# Patient Record
Sex: FEMALE | Race: AMERICAN INDIAN OR ALASKA NATIVE | URBAN - METROPOLITAN AREA
[De-identification: names, ages, dates, MRNs, and addresses within clinical notes are randomized per-mention and may not be internally consistent; named-entity substitution may affect disease eponyms.]

---

## 2019-06-27 ENCOUNTER — OFFICE VISIT (OUTPATIENT)
Dept: FAMILY MEDICINE CLINIC | Age: 24
End: 2019-06-27
Payer: MEDICARE

## 2019-06-27 VITALS
SYSTOLIC BLOOD PRESSURE: 114 MMHG | RESPIRATION RATE: 16 BRPM | HEIGHT: 67 IN | TEMPERATURE: 97.7 F | DIASTOLIC BLOOD PRESSURE: 78 MMHG | WEIGHT: 181.8 LBS | HEART RATE: 66 BPM | BODY MASS INDEX: 28.53 KG/M2

## 2019-06-27 DIAGNOSIS — Z00.00 ENCOUNTER FOR MEDICAL EXAMINATION TO ESTABLISH CARE: ICD-10-CM

## 2019-06-27 DIAGNOSIS — L98.9 SKIN LESIONS, GENERALIZED: ICD-10-CM

## 2019-06-27 DIAGNOSIS — Z23 NEED FOR VACCINE FOR DT (DIPHTHERIA-TETANUS): ICD-10-CM

## 2019-06-27 DIAGNOSIS — Z11.4 ENCOUNTER FOR SCREENING FOR HIV: ICD-10-CM

## 2019-06-27 DIAGNOSIS — F41.9 ANXIETY: Primary | ICD-10-CM

## 2019-06-27 DIAGNOSIS — Z23 NEED FOR HPV VACCINATION: ICD-10-CM

## 2019-06-27 PROCEDURE — 90715 TDAP VACCINE 7 YRS/> IM: CPT | Performed by: PHYSICIAN ASSISTANT

## 2019-06-27 PROCEDURE — G8419 CALC BMI OUT NRM PARAM NOF/U: HCPCS | Performed by: PHYSICIAN ASSISTANT

## 2019-06-27 PROCEDURE — G8427 DOCREV CUR MEDS BY ELIG CLIN: HCPCS | Performed by: PHYSICIAN ASSISTANT

## 2019-06-27 PROCEDURE — 90471 IMMUNIZATION ADMIN: CPT | Performed by: PHYSICIAN ASSISTANT

## 2019-06-27 PROCEDURE — 99204 OFFICE O/P NEW MOD 45 MIN: CPT | Performed by: PHYSICIAN ASSISTANT

## 2019-06-27 PROCEDURE — 1036F TOBACCO NON-USER: CPT | Performed by: PHYSICIAN ASSISTANT

## 2019-06-27 RX ORDER — FLUOXETINE HYDROCHLORIDE 20 MG/1
20 CAPSULE ORAL DAILY
Qty: 30 CAPSULE | Refills: 3 | Status: SHIPPED | OUTPATIENT
Start: 2019-06-27

## 2019-06-27 ASSESSMENT — PATIENT HEALTH QUESTIONNAIRE - PHQ9
2. FEELING DOWN, DEPRESSED OR HOPELESS: 0
SUM OF ALL RESPONSES TO PHQ QUESTIONS 1-9: 0
SUM OF ALL RESPONSES TO PHQ9 QUESTIONS 1 & 2: 0
1. LITTLE INTEREST OR PLEASURE IN DOING THINGS: 0
SUM OF ALL RESPONSES TO PHQ QUESTIONS 1-9: 0

## 2019-06-27 ASSESSMENT — ENCOUNTER SYMPTOMS
CONSTIPATION: 0
BLOOD IN STOOL: 0
NAUSEA: 0
SORE THROAT: 0
BACK PAIN: 0
SINUS PRESSURE: 0
SHORTNESS OF BREATH: 0
DIARRHEA: 0
PHOTOPHOBIA: 0
CHEST TIGHTNESS: 0
ABDOMINAL PAIN: 0
EYE REDNESS: 0
COLOR CHANGE: 0
WHEEZING: 0
ABDOMINAL DISTENTION: 0
VOMITING: 0
COUGH: 0

## 2019-06-27 NOTE — PROGRESS NOTES
Visit Information    Have you changed or started any medications since your last visit including any over-the-counter medicines, vitamins, or herbal medicines? no   Are you having any side effects from any of your medications? -  no  Have you stopped taking any of your medications? Is so, why? -  no    Have you seen any other physician or provider since your last visit? No  Have you had any other diagnostic tests since your last visit? No  Have you been seen in the emergency room and/or had an admission to a hospital since we last saw you? No  Have you had your routine dental cleaning in the past 6 months? no    Have you activated your [employer] account? If not, what are your barriers?  No:      No care team member to display    Medical History Review  Past Medical, Family, and Social History reviewed and does not contribute to the patient presenting condition    Health Maintenance   Topic Date Due    Varicella Vaccine (1 of 2 - 13+ 2-dose series) 03/03/2008    HPV vaccine (1 - Female 3-dose series) 03/03/2010    HIV screen  03/03/2010    Chlamydia screen  03/03/2011    DTaP/Tdap/Td vaccine (1 - Tdap) 03/03/2014    Cervical cancer screen  03/03/2016    Flu vaccine (Season Ended) 09/01/2019    Pneumococcal 0-64 years Vaccine  Aged Out

## 2019-06-27 NOTE — PROGRESS NOTES
[address] [place] [address]  [address] [address]  Dept: [phone]  Dept Fax: [phone]    New Patient Visit Note  Date of patient's visit: 6/27/2019  Patient's Name:  Brandon  YOB: 1995            [address]  ______________________________________________________________________    Reason for visit: Establish care/Preventative care  ______________________________________________________________________  Chief Compliant   Establish Care      ______________________________________________________________________  History of Presenting Illness:  History was obtained from the patient. Brandon  is a 25 y.o. is here to establish care. Patient states that she has not been to a doctor in quite some time. She is here with her counselor who she has been seeing for the last 2-1/2 years. Patient has long-standing history of anxiety and depression. Anxiety is overwhelming and inhibits her from doing some daily activities. Patient has never been on any medications for her anxiety or depression. She denies any suicidal or homicidal ideations. Affect is good but she is obviously anxious during her appointment. Patient was referred to see a psychiatrist at one point and prescribed a medication but she never filled the medication because she was concerned about dependency. Patient states that her mom has dependency issues but also has a neurologic imbalance. Patient states that over the last many years she has noticed intermittent small red spots that pop up on various parts of her body. She states that she notices a new one once or twice a year. She denies any itching, pain, abnormal bleeding, heart palpitations, dizziness or headaches. Health maintenance was reviewed and patient states that her last Pap exam was at the age of 25 when she was pregnant. She has not had any recent STD testing.   She has not had HPV vaccine or recent tetanus shot. She is deciding on health maintenance updates. Questions were answered  Patient denies any smoking history, alcohol use or drug use. She has no other concerns today.    ______________________________________________________________________  Past Medical/Surgical History:    History reviewed. No pertinent past medical history. History reviewed. No pertinent surgical history. ______________________________________________________________________  Health Maintenance Due   Topic Date Due    HPV vaccine (1 - Female 3-dose series) 03/03/2010    HIV screen  03/03/2010    Chlamydia screen  03/03/2011    Cervical cancer screen  03/03/2016       Allergies   Allergen Reactions    Delsym [Dextromethorphan]          Current Outpatient Medications   Medication Sig Dispense Refill    FLUoxetine (PROZAC) 20 MG capsule Take 1 capsule by mouth daily 30 capsule 3     No current facility-administered medications for this visit. Social History     Tobacco Use    Smoking status: Never Smoker    Smokeless tobacco: Never Used   Substance Use Topics    Alcohol use: No    Drug use: No       History reviewed. No pertinent family history. ______________________________________________________________________  Review of Systems   Constitutional: Negative for appetite change, chills, diaphoresis, fatigue, fever and unexpected weight change. HENT: Negative for congestion, sinus pressure and sore throat. Eyes: Negative for photophobia, redness and visual disturbance. Respiratory: Negative for cough, chest tightness, shortness of breath and wheezing. Cardiovascular: Negative for chest pain, palpitations and leg swelling. Gastrointestinal: Negative for abdominal distention, abdominal pain, blood in stool, constipation, diarrhea, nausea and vomiting.    Genitourinary: Negative for decreased urine volume, difficulty urinating, dysuria, frequency, hematuria, menstrual problem, pelvic pain and urgency. Musculoskeletal: Negative for back pain and gait problem. Skin: Negative for color change, pallor and rash. Neurological: Negative for dizziness, syncope, weakness, light-headedness, numbness and headaches. Hematological: Negative for adenopathy. Psychiatric/Behavioral: Positive for agitation, decreased concentration and dysphoric mood. Negative for behavioral problems, confusion, hallucinations, self-injury, sleep disturbance and suicidal ideas. The patient is nervous/anxious. The patient is not hyperactive.        ______________________________________________________________________  Physical Exam   Constitutional: She is oriented to person, place, and time. Vital signs are normal. She appears well-developed and well-nourished. Non-toxic appearance. She does not have a sickly appearance. She does not appear ill. No distress. HENT:   Head: Normocephalic and atraumatic. Right Ear: External ear normal.   Left Ear: External ear normal.   Nose: Nose normal.   Mouth/Throat: Oropharynx is clear and moist.   Eyes: Pupils are equal, round, and reactive to light. Conjunctivae and EOM are normal. Right eye exhibits no discharge. Left eye exhibits no discharge. No scleral icterus. Neck: Normal range of motion. Neck supple. No JVD present. No tracheal deviation present. No thyromegaly present. Cardiovascular: Normal rate, regular rhythm, normal heart sounds and intact distal pulses. Exam reveals no gallop and no friction rub. No murmur heard. Pulmonary/Chest: Effort normal and breath sounds normal. No stridor. No respiratory distress. She has no wheezes. She has no rales. She exhibits no tenderness. Abdominal: Soft. Bowel sounds are normal. She exhibits no distension and no mass. There is no tenderness. There is no rebound and no guarding. Musculoskeletal: Normal range of motion. She exhibits no edema or tenderness. Lymphadenopathy:     She has no cervical adenopathy.    Neurological: She is alert and oriented to person, place, and time. She has normal reflexes. No cranial nerve deficit or sensory deficit. She exhibits normal muscle tone. Coordination normal.   Skin: Skin is warm and dry. No rash noted. She is not diaphoretic. No erythema. No pallor. Psychiatric: Her speech is normal and behavior is normal. Judgment and thought content normal. Her mood appears anxious. Her affect is not angry, not blunt, not labile and not inappropriate. Cognition and memory are normal. She does not exhibit a depressed mood. Nursing note and vitals reviewed. Vitals:    06/27/19 0950   BP: 114/78   Site: Right Upper Arm   Position: Sitting   Cuff Size: Medium Adult   Pulse: 66   Resp: 16   Temp: 97.7 °F (36.5 °C)   TempSrc: Oral   Weight: 181 lb 12.8 oz (82.5 kg)   Height: 5' 7\" (1.702 m)     BP Readings from Last 3 Encounters:   06/27/19 114/78   09/06/16 115/73   12/08/15 123/85          ______________________________________________________________________  Diagnostic findings:  CBC:No results found for: WBC, HGB, PLT    BMP:  No results found for: NA, K, CL, CO2, BUN, CREATININE, GLUCOSE    HEMOGLOBIN A1C: No results found for: LABA1C    FASTING LIPID PANEL:No results found for: CHOL, HDL, TRIG    No results found for this visit on 06/27/19.    ______________________________________________________________________  Assessment and Plan:  Liya Islas was seen today for establish care. Diagnoses and all orders for this visit:    Anxiety  -     TSH With Reflex Ft4; Future  -     FLUoxetine (PROZAC) 20 MG capsule; Take 1 capsule by mouth daily    Skin lesions, generalized  -     CBC Auto Differential; Future  -     Basic Metabolic Panel; Future    Encounter for medical examination to establish care  -     CBC Auto Differential; Future    Encounter for screening for HIV  -     HIV Screen;  Future    Need for HPV vaccination  -     Cancel: HPV vaccine 9-valent IM (GARDASIL 9)    Need for vaccine for DT (diphtheria-tetanus)  -     Tdap (age 6y and older) IM ([address])        ______________________________________________________________________  Follow up and instructions:  Return in about 3 months (around 9/27/2019), or if symptoms worsen or fail to improve. · Discussed use, benefit, and side effects of prescribed medications. Barriers to medication compliance addressed. All patient questions answered. Pt voiced understanding. · Patient given educational materials - see patient instructions    · Patient instructed to call the office or go directly to the ER for any worsening problems or concerns. Patient voiced understanding    Juju Fischer. [address]  6/27/2019, 11:57 AM    This note is created with the assistance of a speech-recognition program. While intending to generate a document that actually reflects the content of the visit, the document can still have some mistakes which may not have been identified and corrected by editing.

## 2019-08-01 ENCOUNTER — OFFICE VISIT (OUTPATIENT)
Dept: FAMILY MEDICINE CLINIC | Age: 24
End: 2019-08-01
Payer: MEDICARE

## 2019-08-01 ENCOUNTER — HOSPITAL ENCOUNTER (OUTPATIENT)
Age: 24
Setting detail: SPECIMEN
Discharge: HOME | End: 2019-08-01
Payer: MEDICARE

## 2019-08-01 VITALS
HEART RATE: 68 BPM | HEIGHT: 67 IN | RESPIRATION RATE: 16 BRPM | BODY MASS INDEX: 28.25 KG/M2 | DIASTOLIC BLOOD PRESSURE: 79 MMHG | TEMPERATURE: 98.3 F | WEIGHT: 180 LBS | SYSTOLIC BLOOD PRESSURE: 109 MMHG

## 2019-08-01 DIAGNOSIS — Z11.8 SCREENING FOR CHLAMYDIAL DISEASE: ICD-10-CM

## 2019-08-01 DIAGNOSIS — N89.8 VAGINAL DISCHARGE: ICD-10-CM

## 2019-08-01 DIAGNOSIS — Z12.4 PAP SMEAR FOR CERVICAL CANCER SCREENING: Primary | ICD-10-CM

## 2019-08-01 PROCEDURE — 99395 PREV VISIT EST AGE 18-39: CPT | Performed by: PHYSICIAN ASSISTANT

## 2019-08-01 ASSESSMENT — ENCOUNTER SYMPTOMS
VOMITING: 0
BLOOD IN STOOL: 0
SHORTNESS OF BREATH: 0
SORE THROAT: 0
WHEEZING: 0
SINUS PAIN: 0
DIARRHEA: 0
BACK PAIN: 0
CONSTIPATION: 0
NAUSEA: 0
CHEST TIGHTNESS: 0
ABDOMINAL PAIN: 0
COUGH: 0

## 2019-08-02 LAB
C TRACH DNA GENITAL QL NAA+PROBE: NEGATIVE
DIRECT EXAM: NORMAL
Lab: NORMAL
N. GONORRHOEAE DNA: NEGATIVE
SPECIMEN DESCRIPTION: NORMAL
SPECIMEN DESCRIPTION: NORMAL

## 2019-08-06 LAB — CYTOLOGY REPORT: NORMAL

## 2022-11-11 ENCOUNTER — OFFICE VISIT (OUTPATIENT)
Dept: ORTHOPEDIC SURGERY | Age: 27
End: 2022-11-11
Payer: COMMERCIAL

## 2022-11-11 DIAGNOSIS — M25.562 LEFT KNEE PAIN, UNSPECIFIED CHRONICITY: Primary | ICD-10-CM

## 2022-11-11 PROCEDURE — 99203 OFFICE O/P NEW LOW 30 MIN: CPT | Performed by: ORTHOPAEDIC SURGERY

## 2022-11-11 NOTE — PROGRESS NOTES
Oral Avelar M.D.            [address] [place] [address]., [address] [facility],[address], [address] [facility]           Dept Phone: [phone]           Dept Fax:  [address] [place]          Dept Phone: [phone]           Dept Fax:  [phone]      Chief Compliant:  Chief Complaint   Patient presents with    Pain     Lt knee        History of Present Illness: This is a 32 y.o. female who presents to the clinic today for evaluation / follow up of 1 year history of left knee. Patient is a 80-year-old  who states that she did have a fall approximately a year ago on her left knee she has been more less been living with it for some time she did have previous x-rays outside which showed no evidence for acute fracture dislocation but does show an effusion. She states that she has pain with twisting and turning and catching to difficulty ascending descending stairs as well. .       Review of Systems   Constitutional: Negative for fever, chills, sweats. Eyes: Negative for changes in vision, or pain. HENT: Negative for ear ache, epistaxis, or sore throat. Respiratory/Cardio: Negative for Chest pain, palpitations, SOB, or cough. Gastrointestinal: Negative for abdominal pain, N/V/D. Genitourinary: Negative for dysuria, frequency, urgency, or hematuria. Neurological: Negative for headache, numbness, or weakness. Integumentary: Negative for rash, itching, laceration, or abrasion. Musculoskeletal: Positive for Pain (Lt knee)       Physical Exam:  Constitutional: Patient is oriented to person, place, and time. Patient appears well-developed and well nourished. HENT: Negative otherwise noted  Head: Normocephalic and Atraumatic  Nose: Normal  Eyes: Conjunctivae and EOM are normal  Neck: Normal range of motion Neck supple.     Respiratory/Cardio:

## 2022-11-18 ENCOUNTER — HOSPITAL ENCOUNTER (OUTPATIENT)
Dept: MRI IMAGING | Age: 27
Discharge: HOME | End: 2022-11-20
Payer: COMMERCIAL

## 2022-11-18 DIAGNOSIS — M25.562 LEFT KNEE PAIN, UNSPECIFIED CHRONICITY: ICD-10-CM

## 2022-11-18 PROCEDURE — 73721 MRI JNT OF LWR EXTRE W/O DYE: CPT

## 2022-11-21 ENCOUNTER — TELEPHONE (OUTPATIENT)
Dept: ORTHOPEDIC SURGERY | Age: 27
End: 2022-11-21

## 2022-11-21 NOTE — TELEPHONE ENCOUNTER
I spoke with patient on results, she voiced understanding. She will contact her insurance on PT and will call us back if she would like us to order this for her.

## 2022-11-21 NOTE — TELEPHONE ENCOUNTER
----- Message from Carmel Freitas MD sent at 11/18/2022  2:20 PM EST -----  Basically normal MRI other than some mild patellofemoral chondromalacia and patellar tendinosis. Would send the patient for physical therapy for these entities.   No follow-up is necessary  ----- Message -----  From: Giovany Anthony Incoming Radiant Results From [facility]  Sent: 11/18/2022   1:41 PM EST  To: Carmel Freitas MD